# Patient Record
Sex: FEMALE | Race: BLACK OR AFRICAN AMERICAN | NOT HISPANIC OR LATINO | Employment: FULL TIME | URBAN - METROPOLITAN AREA
[De-identification: names, ages, dates, MRNs, and addresses within clinical notes are randomized per-mention and may not be internally consistent; named-entity substitution may affect disease eponyms.]

---

## 2023-09-20 LAB
HEPATITIS B VIRUS SURFACE AG PRESENCE IN SERUM: NONREACTIVE
HEPATITIS C VIRUS AB PRESENCE IN SERUM: NONREACTIVE
HIV 1/ 2 AG/AB SCREEN: NONREACTIVE
SYPHILIS TOTAL AB: NONREACTIVE

## 2023-09-21 LAB
CHLAMYDIA TRACH., AMPLIFIED: NEGATIVE
CLUE CELLS: ABNORMAL
N. GONORRHEA, AMPLIFIED: NEGATIVE
NUGENT SCORE: 6
TRICHOMONAS VAGINALIS: NEGATIVE
VAGINITIS-BV + YEAST INTERPRETATION: ABNORMAL
YEAST: ABNORMAL

## 2024-09-13 ENCOUNTER — OFFICE VISIT (OUTPATIENT)
Dept: OBSTETRICS AND GYNECOLOGY | Facility: CLINIC | Age: 24
End: 2024-09-13
Payer: COMMERCIAL

## 2024-09-13 ENCOUNTER — LAB (OUTPATIENT)
Dept: LAB | Facility: LAB | Age: 24
End: 2024-09-13
Payer: COMMERCIAL

## 2024-09-13 VITALS
DIASTOLIC BLOOD PRESSURE: 87 MMHG | SYSTOLIC BLOOD PRESSURE: 127 MMHG | HEART RATE: 76 BPM | WEIGHT: 181.3 LBS | BODY MASS INDEX: 28.4 KG/M2

## 2024-09-13 DIAGNOSIS — N90.89 VULVAR LESION: ICD-10-CM

## 2024-09-13 DIAGNOSIS — Z11.3 SCREENING FOR STDS (SEXUALLY TRANSMITTED DISEASES): ICD-10-CM

## 2024-09-13 DIAGNOSIS — Z01.419 VISIT FOR GYNECOLOGIC EXAMINATION: ICD-10-CM

## 2024-09-13 DIAGNOSIS — Z01.419 VISIT FOR GYNECOLOGIC EXAMINATION: Primary | ICD-10-CM

## 2024-09-13 DIAGNOSIS — B00.9 HSV-2 (HERPES SIMPLEX VIRUS 2) INFECTION: ICD-10-CM

## 2024-09-13 LAB
ERYTHROCYTE [DISTWIDTH] IN BLOOD BY AUTOMATED COUNT: 13.1 % (ref 11.5–14.5)
EST. AVERAGE GLUCOSE BLD GHB EST-MCNC: 91 MG/DL
HBA1C MFR BLD: 4.8 %
HBV SURFACE AG SERPL QL IA: NONREACTIVE
HCT VFR BLD AUTO: 41.3 % (ref 36–46)
HCV AB SER QL: NONREACTIVE
HGB BLD-MCNC: 12.9 G/DL (ref 12–16)
HIV 1+2 AB+HIV1 P24 AG SERPL QL IA: NONREACTIVE
MCH RBC QN AUTO: 27.2 PG (ref 26–34)
MCHC RBC AUTO-ENTMCNC: 31.2 G/DL (ref 32–36)
MCV RBC AUTO: 87 FL (ref 80–100)
NRBC BLD-RTO: 0 /100 WBCS (ref 0–0)
PLATELET # BLD AUTO: 278 X10*3/UL (ref 150–450)
RBC # BLD AUTO: 4.75 X10*6/UL (ref 4–5.2)
TREPONEMA PALLIDUM IGG+IGM AB [PRESENCE] IN SERUM OR PLASMA BY IMMUNOASSAY: NONREACTIVE
TSH SERPL-ACNC: 0.63 MIU/L (ref 0.44–3.98)
WBC # BLD AUTO: 4.4 X10*3/UL (ref 4.4–11.3)

## 2024-09-13 PROCEDURE — 99395 PREV VISIT EST AGE 18-39: CPT | Performed by: ADVANCED PRACTICE MIDWIFE

## 2024-09-13 PROCEDURE — 84443 ASSAY THYROID STIM HORMONE: CPT

## 2024-09-13 PROCEDURE — 85027 COMPLETE CBC AUTOMATED: CPT

## 2024-09-13 PROCEDURE — 86780 TREPONEMA PALLIDUM: CPT

## 2024-09-13 PROCEDURE — 87340 HEPATITIS B SURFACE AG IA: CPT

## 2024-09-13 PROCEDURE — 87529 HSV DNA AMP PROBE: CPT | Performed by: ADVANCED PRACTICE MIDWIFE

## 2024-09-13 PROCEDURE — 83036 HEMOGLOBIN GLYCOSYLATED A1C: CPT

## 2024-09-13 PROCEDURE — 87389 HIV-1 AG W/HIV-1&-2 AB AG IA: CPT

## 2024-09-13 PROCEDURE — 86803 HEPATITIS C AB TEST: CPT

## 2024-09-13 PROCEDURE — 1036F TOBACCO NON-USER: CPT | Performed by: ADVANCED PRACTICE MIDWIFE

## 2024-09-13 PROCEDURE — 36415 COLL VENOUS BLD VENIPUNCTURE: CPT

## 2024-09-13 RX ORDER — VALACYCLOVIR HYDROCHLORIDE 500 MG/1
500 TABLET, FILM COATED ORAL 2 TIMES DAILY
Qty: 60 TABLET | Refills: 0 | Status: SHIPPED | OUTPATIENT
Start: 2024-09-13 | End: 2024-09-16

## 2024-09-13 ASSESSMENT — PAIN SCALES - GENERAL: PAINLEVEL: 0-NO PAIN

## 2024-09-13 NOTE — PROGRESS NOTES
Subjective   John Paul Jamison is a 24 y.o. female who is here for Annual Exam (LMP 8/26/24/Last pap 9/2021/No currently birthcontrol. /).     Concerns today:  Stopped birth control  LMP 8/26 -8/30     Periods are regular every 27 days, lasting 4 days.   Dysmenorrhea:moderate, occurring first 1-2 days of flow.   Cyclic symptoms include none.      Sexual Activity: sexually active, male partners; Patient reports 0 partners in the last 12 months.  Pain with intercourse? No   Loss of desire? No   Able to have an orgasm? No     History of prior STI: herpes feels she is having outbreak starting   Desires STI screening? Yes    Current contraception: none    Last pap: 2021 WNL  History of abnormal Pap smear: no  Family history of uterine or ovarian cancer: no    Last mammogram: n/a  History of abnormal mammogram: no  Family history of breast cancer: no  OB History   No obstetric history on file.      Objective   /87   Pulse 76   Wt 82.2 kg (181 lb 4.8 oz)   LMP 08/26/2024 (Exact Date)    Physical Exam  Vitals reviewed.   Constitutional:       General: She is not in acute distress.     Appearance: Normal appearance.   HENT:      Head: Normocephalic.   Eyes:      Pupils: Pupils are equal, round, and reactive to light.   Cardiovascular:      Rate and Rhythm: Normal rate and regular rhythm.      Heart sounds: No murmur heard.     No gallop.   Pulmonary:      Effort: Pulmonary effort is normal. No respiratory distress.      Breath sounds: Normal breath sounds. No stridor. No wheezing, rhonchi or rales.   Chest:      Chest wall: No tenderness or crepitus.   Breasts:     Right: No inverted nipple, mass, nipple discharge, skin change or tenderness.      Left: Normal. No inverted nipple, mass, nipple discharge, skin change or tenderness.   Abdominal:      General: Abdomen is flat.      Palpations: Abdomen is soft. There is no mass.      Tenderness: There is no abdominal tenderness. There is no right CVA tenderness, left CVA  tenderness or rebound.      Hernia: No hernia is present.   Genitourinary:     General: Normal vulva.      Pubic Area: No rash.       Labia:         Right: No rash, tenderness, lesion or injury.         Left: No rash, tenderness, lesion or injury.       Urethra: No prolapse or urethral swelling.      Vagina: Normal. No foreign body. No erythema, tenderness, bleeding, lesions or prolapsed vaginal walls.      Cervix: No cervical motion tenderness, friability or lesion.      Uterus: Normal. Not enlarged, not tender and no uterine prolapse.       Adnexa: Right adnexa normal and left adnexa normal.        Right: No mass or tenderness.          Left: No mass or tenderness.        Rectum: Normal.          Comments: 8mm lesion noted on mons- not fluid filled, non-tender, no edema or erythema   Musculoskeletal:      Cervical back: Neck supple. No rigidity or tenderness.   Skin:     General: Skin is warm and dry.   Neurological:      Mental Status: She is alert.   Psychiatric:         Mood and Affect: Mood normal.         Behavior: Behavior normal.         Thought Content: Thought content normal.         Judgment: Judgment normal.          Assessment/Plan   Problem List Items Addressed This Visit             ICD-10-CM    HSV-2 (herpes simplex virus 2) infection B00.9    Relevant Medications    valACYclovir (Valtrex) 500 mg tablet     Other Visit Diagnoses         Codes    Visit for gynecologic examination    -  Primary Z01.419    Relevant Orders    CBC    THINPREP PAP TEST    Hemoglobin A1c    TSH with reflex to Free T4 if abnormal    Screening for STDs (sexually transmitted diseases)     Z11.3    Relevant Orders    THINPREP PAP TEST    Hepatitis C Antibody    HIV-1 and HIV-2 antibodies    Syphilis Screen with Reflex    Hepatitis B Surface Antigen        Strongly favors folliculitis    Follow up in 1 year (on 9/13/2025).  MIKAYLA Atkinson-JAVON

## 2024-09-14 LAB
HSV1 DNA SKIN QL NAA+PROBE: NOT DETECTED
HSV2 DNA SKIN QL NAA+PROBE: NOT DETECTED

## 2024-09-17 ENCOUNTER — TELEPHONE (OUTPATIENT)
Dept: OBSTETRICS AND GYNECOLOGY | Facility: CLINIC | Age: 24
End: 2024-09-17
Payer: COMMERCIAL

## 2024-09-17 LAB
C TRACH RRNA SPEC QL NAA+PROBE: NEGATIVE
N GONORRHOEA DNA SPEC QL PROBE+SIG AMP: NEGATIVE
T VAGINALIS RRNA SPEC QL NAA+PROBE: POSITIVE

## 2024-09-17 NOTE — TELEPHONE ENCOUNTER
----- Message from Jyoti Manzanares sent at 9/17/2024  1:37 PM EDT -----  John Paul has trich I sent meds and commented  You have a sexually transmitted infection called Trichomonas. It can be treated with pills. Please take twice daily for 7days. All sex partners need treatment. Do not have sex until all partners are treated  Jyoti NICHOLS    9/17/24 2453 Tried to reach pt at # listed. Phone rings and then gets a busy signal. Will try back at  later time. [employer] message also sent.

## 2024-09-18 NOTE — PROGRESS NOTES
I spoke with pt.  She is aware of results and will be picking up her medication today.  She had no questions or concerns at this time.

## 2024-09-25 LAB
CYTOLOGY CMNT CVX/VAG CYTO-IMP: NORMAL
LAB AP HPV GENOTYPE QUESTION: YES
LAB AP HPV HR: NORMAL
LAB AP PAP ADDITIONAL TESTS: NORMAL
LABORATORY COMMENT REPORT: NORMAL
LMP START DATE: NORMAL
PATH REPORT.TOTAL CANCER: NORMAL

## 2024-12-05 DIAGNOSIS — A59.9 TRICHOMONAS INFECTION: ICD-10-CM

## 2024-12-06 RX ORDER — METRONIDAZOLE 500 MG/1
500 TABLET ORAL 2 TIMES DAILY
Qty: 14 TABLET | Refills: 0 | Status: SHIPPED | OUTPATIENT
Start: 2024-12-06 | End: 2024-12-13

## 2025-02-27 DIAGNOSIS — B00.9 HSV-2 (HERPES SIMPLEX VIRUS 2) INFECTION: ICD-10-CM

## 2025-02-28 DIAGNOSIS — B00.9 HSV INFECTION: Primary | ICD-10-CM

## 2025-02-28 RX ORDER — VALACYCLOVIR HYDROCHLORIDE 500 MG/1
500 TABLET, FILM COATED ORAL DAILY
Qty: 30 TABLET | Refills: 5 | Status: SHIPPED | OUTPATIENT
Start: 2025-02-28 | End: 2025-08-27

## 2025-02-28 RX ORDER — VALACYCLOVIR HYDROCHLORIDE 500 MG/1
500 TABLET, FILM COATED ORAL 2 TIMES DAILY
Qty: 60 TABLET | Refills: 0 | OUTPATIENT
Start: 2025-02-28 | End: 2025-03-03

## 2025-06-13 ENCOUNTER — OFFICE VISIT (OUTPATIENT)
Dept: OBSTETRICS AND GYNECOLOGY | Facility: CLINIC | Age: 25
End: 2025-06-13
Payer: COMMERCIAL

## 2025-06-13 VITALS
HEART RATE: 63 BPM | BODY MASS INDEX: 24.78 KG/M2 | SYSTOLIC BLOOD PRESSURE: 118 MMHG | WEIGHT: 158.2 LBS | DIASTOLIC BLOOD PRESSURE: 79 MMHG

## 2025-06-13 DIAGNOSIS — Z11.3 SCREEN FOR STD (SEXUALLY TRANSMITTED DISEASE): Primary | ICD-10-CM
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
## 2025-06-13 DIAGNOSIS — N94.6 DYSMENORRHEA: ICD-10-CM

## 2025-06-13 PROCEDURE — 99213 OFFICE O/P EST LOW 20 MIN: CPT | Performed by: ADVANCED PRACTICE MIDWIFE

## 2025-06-13 RX ORDER — MUPIROCIN 20 MG/G
OINTMENT TOPICAL 2 TIMES DAILY
Qty: 15 G | Refills: 0 | Status: SHIPPED | OUTPATIENT
Start: 2025-06-13

## 2025-06-13 RX ORDER — MUPIROCIN CALCIUM 20 MG/G
CREAM TOPICAL 3 TIMES DAILY
Qty: 15 G | Refills: 0 | Status: SHIPPED | OUTPATIENT
Start: 2025-06-13 | End: 2025-06-13

## 2025-06-13 RX ORDER — IBUPROFEN 600 MG/1
600 TABLET, FILM COATED ORAL EVERY 6 HOURS PRN
Qty: 120 TABLET | Refills: 0 | Status: SHIPPED | OUTPATIENT
Start: 2025-06-13 | End: 2025-06-23
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
## 2025-06-14 LAB
C TRACH RRNA SPEC QL NAA+PROBE: NOT DETECTED
HBV SURFACE AG SERPL QL IA: NORMAL
HCV AB SERPL QL IA: NORMAL
N GONORRHOEA RRNA SPEC QL NAA+PROBE: NOT DETECTED
QUEST GC CT AMPLIFIED (ALWAYS MESSAGE): NORMAL
T PALLIDUM AB SER QL IA: NORMAL
T VAGINALIS RRNA SPEC QL NAA+PROBE: NOT DETECTED

## 2025-06-18 LAB
HBV SURFACE AG SERPL QL IA: NORMAL
HCV AB SERPL QL IA: NORMAL
T PALLIDUM AB SER QL IA: NEGATIVE

## 2025-07-01 ENCOUNTER — TELEPHONE (OUTPATIENT)
Dept: OBSTETRICS AND GYNECOLOGY | Facility: CLINIC | Age: 25
End: 2025-07-01
Payer: COMMERCIAL

## 2025-07-01 DIAGNOSIS — Z11.3 SCREENING FOR STD (SEXUALLY TRANSMITTED DISEASE): ICD-10-CM

## 2025-07-01 NOTE — TELEPHONE ENCOUNTER
TELEPHONE CALL TO PATIENT  Identified by name and date of birth.  Requesting results from 6/13 visit - HIV test  Hep C, Hep B, Syphilis completed and normal  HIV test order in system but not collected  Reviewed need for another blood draw if wants HIV done, order still valid  Patient verbalizes understanding and will come in to have done.

## 2025-07-01 NOTE — TELEPHONE ENCOUNTER
----- Message from Nurse Nicole MAK sent at 6/30/2025  3:49 PM EDT -----  Regarding: Test Results  Per ;    John Paul Jamison [phone] calling to discuss test results

## 2025-07-02 LAB
HIV 1+2 AB+HIV1 P24 AG SERPL QL IA: NORMAL
HIV 1+2 AB+HIV1 P24 AG SERPL QL IA: NORMAL

## 2025-07-11 DIAGNOSIS — N94.6 DYSMENORRHEA: ICD-10-CM

## 2025-07-15 RX ORDER — IBUPROFEN 600 MG/1
600 TABLET, FILM COATED ORAL EVERY 6 HOURS PRN
Qty: 120 TABLET | Refills: 0 | Status: SHIPPED | OUTPATIENT
Start: 2025-07-15 | End: 2025-07-25

## 2025-08-04 ENCOUNTER — HOSPITAL ENCOUNTER (EMERGENCY)
Facility: HOSPITAL | Age: 25
Discharge: HOME | End: 2025-08-05
Attending: EMERGENCY MEDICINE
Payer: COMMERCIAL

## 2025-08-04 DIAGNOSIS — R11.2 NAUSEA AND VOMITING, UNSPECIFIED VOMITING TYPE: ICD-10-CM

## 2025-08-04 DIAGNOSIS — K80.21 CALCULUS OF GALLBLADDER WITH BILIARY OBSTRUCTION BUT WITHOUT CHOLECYSTITIS: Primary | ICD-10-CM

## 2025-08-04 LAB
ALBUMIN SERPL BCP-MCNC: 4.6 G/DL (ref 3.4–5)
ALP SERPL-CCNC: 175 U/L (ref 33–110)
ALT SERPL W P-5'-P-CCNC: 313 U/L (ref 7–45)
ANION GAP SERPL CALC-SCNC: 11 MMOL/L (ref 10–20)
AST SERPL W P-5'-P-CCNC: 517 U/L (ref 9–39)
B-HCG SERPL-ACNC: <2 MIU/ML
BASOPHILS # BLD AUTO: 0.03 X10*3/UL (ref 0–0.1)
BASOPHILS NFR BLD AUTO: 0.5 %
BILIRUB SERPL-MCNC: 2 MG/DL (ref 0–1.2)
BUN SERPL-MCNC: 9 MG/DL (ref 6–23)
CALCIUM SERPL-MCNC: 9.7 MG/DL (ref 8.6–10.3)
CHLORIDE SERPL-SCNC: 103 MMOL/L (ref 98–107)
CO2 SERPL-SCNC: 29 MMOL/L (ref 21–32)
CREAT SERPL-MCNC: 0.82 MG/DL (ref 0.5–1.05)
EGFRCR SERPLBLD CKD-EPI 2021: >90 ML/MIN/1.73M*2
EOSINOPHIL # BLD AUTO: 0.04 X10*3/UL (ref 0–0.7)
EOSINOPHIL NFR BLD AUTO: 0.7 %
ERYTHROCYTE [DISTWIDTH] IN BLOOD BY AUTOMATED COUNT: 13.6 % (ref 11.5–14.5)
FLUAV RNA RESP QL NAA+PROBE: NOT DETECTED
FLUBV RNA RESP QL NAA+PROBE: NOT DETECTED
GLUCOSE SERPL-MCNC: 92 MG/DL (ref 74–99)
HCT VFR BLD AUTO: 43.8 % (ref 36–46)
HGB BLD-MCNC: 14 G/DL (ref 12–16)
IMM GRANULOCYTES # BLD AUTO: 0.01 X10*3/UL (ref 0–0.7)
IMM GRANULOCYTES NFR BLD AUTO: 0.2 % (ref 0–0.9)
LYMPHOCYTES # BLD AUTO: 2 X10*3/UL (ref 1.2–4.8)
LYMPHOCYTES NFR BLD AUTO: 35.1 %
MCH RBC QN AUTO: 28 PG (ref 26–34)
MCHC RBC AUTO-ENTMCNC: 32 G/DL (ref 32–36)
MCV RBC AUTO: 88 FL (ref 80–100)
MONOCYTES # BLD AUTO: 0.72 X10*3/UL (ref 0.1–1)
MONOCYTES NFR BLD AUTO: 12.7 %
NEUTROPHILS # BLD AUTO: 2.89 X10*3/UL (ref 1.2–7.7)
NEUTROPHILS NFR BLD AUTO: 50.8 %
NRBC BLD-RTO: 0 /100 WBCS (ref 0–0)
PLATELET # BLD AUTO: 260 X10*3/UL (ref 150–450)
POTASSIUM SERPL-SCNC: 3.8 MMOL/L (ref 3.5–5.3)
PROT SERPL-MCNC: 7.8 G/DL (ref 6.4–8.2)
RBC # BLD AUTO: 5 X10*6/UL (ref 4–5.2)
RSV RNA RESP QL NAA+PROBE: NOT DETECTED
SARS-COV-2 RNA RESP QL NAA+PROBE: NOT DETECTED
SODIUM SERPL-SCNC: 139 MMOL/L (ref 136–145)
WBC # BLD AUTO: 5.7 X10*3/UL (ref 4.4–11.3)

## 2025-08-04 PROCEDURE — 96374 THER/PROPH/DIAG INJ IV PUSH: CPT

## 2025-08-04 PROCEDURE — 99285 EMERGENCY DEPT VISIT HI MDM: CPT | Mod: 25 | Performed by: EMERGENCY MEDICINE

## 2025-08-04 PROCEDURE — 36415 COLL VENOUS BLD VENIPUNCTURE: CPT | Performed by: EMERGENCY MEDICINE

## 2025-08-04 PROCEDURE — 81001 URINALYSIS AUTO W/SCOPE: CPT | Performed by: EMERGENCY MEDICINE

## 2025-08-04 PROCEDURE — 84702 CHORIONIC GONADOTROPIN TEST: CPT | Performed by: EMERGENCY MEDICINE

## 2025-08-04 PROCEDURE — 83690 ASSAY OF LIPASE: CPT | Performed by: EMERGENCY MEDICINE

## 2025-08-04 PROCEDURE — 85025 COMPLETE CBC W/AUTO DIFF WBC: CPT | Performed by: EMERGENCY MEDICINE

## 2025-08-04 PROCEDURE — 87637 SARSCOV2&INF A&B&RSV AMP PRB: CPT | Performed by: EMERGENCY MEDICINE

## 2025-08-04 PROCEDURE — 2500000004 HC RX 250 GENERAL PHARMACY W/ HCPCS (ALT 636 FOR OP/ED): Performed by: EMERGENCY MEDICINE

## 2025-08-04 PROCEDURE — 80053 COMPREHEN METABOLIC PANEL: CPT | Performed by: EMERGENCY MEDICINE

## 2025-08-04 RX ORDER — ONDANSETRON HYDROCHLORIDE 2 MG/ML
4 INJECTION, SOLUTION INTRAVENOUS ONCE
Status: COMPLETED | OUTPATIENT
Start: 2025-08-04 | End: 2025-08-04

## 2025-08-04 RX ADMIN — ONDANSETRON 4 MG: 2 INJECTION, SOLUTION INTRAMUSCULAR; INTRAVENOUS at 23:43

## 2025-08-04 RX ADMIN — SODIUM CHLORIDE 1000 ML: 0.9 INJECTION, SOLUTION INTRAVENOUS at 23:45

## 2025-08-04 ASSESSMENT — PAIN DESCRIPTION - PAIN TYPE: TYPE: ACUTE PAIN

## 2025-08-04 ASSESSMENT — PAIN - FUNCTIONAL ASSESSMENT: PAIN_FUNCTIONAL_ASSESSMENT: 0-10

## 2025-08-04 ASSESSMENT — PAIN DESCRIPTION - ORIENTATION: ORIENTATION: MID

## 2025-08-04 ASSESSMENT — PAIN SCALES - GENERAL
PAINLEVEL_OUTOF10: 4
PAINLEVEL_OUTOF10: 4

## 2025-08-04 ASSESSMENT — PAIN DESCRIPTION - LOCATION: LOCATION: ABDOMEN

## 2025-08-05 ENCOUNTER — APPOINTMENT (OUTPATIENT)
Dept: RADIOLOGY | Facility: HOSPITAL | Age: 25
End: 2025-08-05
Payer: COMMERCIAL

## 2025-08-05 VITALS
DIASTOLIC BLOOD PRESSURE: 71 MMHG | SYSTOLIC BLOOD PRESSURE: 116 MMHG | TEMPERATURE: 98.6 F | OXYGEN SATURATION: 99 % | HEART RATE: 69 BPM | RESPIRATION RATE: 16 BRPM

## 2025-08-05 LAB
APPEARANCE UR: CLEAR
BILIRUB UR STRIP.AUTO-MCNC: ABNORMAL MG/DL
COLOR UR: YELLOW
GLUCOSE UR STRIP.AUTO-MCNC: NORMAL MG/DL
KETONES UR STRIP.AUTO-MCNC: NEGATIVE MG/DL
LEUKOCYTE ESTERASE UR QL STRIP.AUTO: NEGATIVE
LIPASE SERPL-CCNC: 31 U/L (ref 9–82)
NITRITE UR QL STRIP.AUTO: NEGATIVE
PH UR STRIP.AUTO: 7 [PH]
PROT UR STRIP.AUTO-MCNC: ABNORMAL MG/DL
RBC # UR STRIP.AUTO: NEGATIVE MG/DL
RBC #/AREA URNS AUTO: NORMAL /HPF
SP GR UR STRIP.AUTO: 1.02
SQUAMOUS #/AREA URNS AUTO: NORMAL /HPF
UROBILINOGEN UR STRIP.AUTO-MCNC: ABNORMAL MG/DL
WBC #/AREA URNS AUTO: NORMAL /HPF

## 2025-08-05 PROCEDURE — 76705 ECHO EXAM OF ABDOMEN: CPT

## 2025-08-05 PROCEDURE — 74177 CT ABD & PELVIS W/CONTRAST: CPT | Performed by: RADIOLOGY

## 2025-08-05 PROCEDURE — 2550000001 HC RX 255 CONTRASTS: Performed by: EMERGENCY MEDICINE

## 2025-08-05 PROCEDURE — 74177 CT ABD & PELVIS W/CONTRAST: CPT

## 2025-08-05 PROCEDURE — 76705 ECHO EXAM OF ABDOMEN: CPT | Performed by: RADIOLOGY

## 2025-08-05 RX ADMIN — IOHEXOL 75 ML: 350 INJECTION, SOLUTION INTRAVENOUS at 00:25

## 2025-08-05 NOTE — ED PROVIDER NOTES
HPI   Chief Complaint   Patient presents with    Vomiting       Patient presents with nausea vomiting off and on for the last 2 3 days.  She notes that whenever she tries eat several hours later it comes back up.  She denies any blood in her emesis.  She did have diarrhea on the onset of symptoms.  She denies any new fever or cough.  Has no prior abdominal surgery history.  She does smoke occasional marijuana.  Denies any other street drugs.  Has never had vomiting issues with marijuana before.              Patient History   Medical History[1]  Surgical History[2]  Family History[3]  Social History[4]    Physical Exam   ED Triage Vitals [08/04/25 1939]   Temperature Heart Rate Respirations BP   37 °C (98.6 °F) 69 16 121/71      Pulse Ox Temp Source Heart Rate Source Patient Position   100 % Oral Monitor Sitting      BP Location FiO2 (%)     Right arm --       Physical Exam  Vitals and nursing note reviewed.   Constitutional:       General: She is not in acute distress.     Appearance: She is well-developed.   HENT:      Head: Normocephalic and atraumatic.      Nose: Nose normal.      Mouth/Throat:      Mouth: Mucous membranes are moist.     Eyes:      Conjunctiva/sclera: Conjunctivae normal.       Cardiovascular:      Rate and Rhythm: Regular rhythm.      Heart sounds: No murmur heard.  Pulmonary:      Effort: Pulmonary effort is normal. No respiratory distress.   Abdominal:      General: There is no distension.      Palpations: Abdomen is soft.      Tenderness: There is no right CVA tenderness, left CVA tenderness, guarding or rebound.      Comments: Negative Henderson     Musculoskeletal:         General: No swelling.      Cervical back: Neck supple.     Skin:     General: Skin is warm and dry.     Neurological:      General: No focal deficit present.      Mental Status: She is alert and oriented to person, place, and time.     Psychiatric:         Mood and Affect: Mood normal.           ED Course & MDM   Diagnoses  as of 08/05/25 0527   Calculus of gallbladder with biliary obstruction but without cholecystitis   Nausea and vomiting, unspecified vomiting type                 No data recorded     [place] Coma Scale Score: 15 (08/04/25 2122 : Carla Roche RN)                           Medical Decision Making  The patient has no Henderson sign she has no overt jaundice but her bilirubin is slightly elevated and she does have a transaminitis.  Unclear if this is viral hepatitis versus other stomach bug versus alcohol versus gallbladder pathology.  The patient has an unremarkable exam currently does not have any pain.  We did discuss admission but given that she is pain-free at this time she prefers to be discharged home and follow-up with surgery as an outpatient.  She may require outpatient ERCP or follow-up with GI as well.    Procedure  Procedures       [1]   Past Medical History:  Diagnosis Date    Other specified health status     No pertinent past medical history   [2]   Past Surgical History:  Procedure Laterality Date    OTHER SURGICAL HISTORY  09/13/2021    Nose surgery   [3] No family history on file.  [4]   Social History  Tobacco Use    Smoking status: Never    Smokeless tobacco: Never   Vaping Use    Vaping status: Some Days   Substance Use Topics    Alcohol use: Yes    Drug use: Never        David Willoughby MD  08/05/25 0530

## 2025-08-07 ENCOUNTER — HOSPITAL ENCOUNTER (EMERGENCY)
Facility: HOSPITAL | Age: 25
Discharge: HOME | End: 2025-08-07
Attending: EMERGENCY MEDICINE
Payer: COMMERCIAL

## 2025-08-07 VITALS
RESPIRATION RATE: 21 BRPM | BODY MASS INDEX: 23.54 KG/M2 | WEIGHT: 150 LBS | SYSTOLIC BLOOD PRESSURE: 110 MMHG | OXYGEN SATURATION: 100 % | DIASTOLIC BLOOD PRESSURE: 88 MMHG | TEMPERATURE: 97.3 F | HEIGHT: 67 IN | HEART RATE: 74 BPM

## 2025-08-07 DIAGNOSIS — K80.50 BILIARY COLIC: Primary | ICD-10-CM

## 2025-08-07 LAB
ALBUMIN SERPL BCP-MCNC: 4.5 G/DL (ref 3.4–5)
ALP SERPL-CCNC: 119 U/L (ref 33–110)
ALT SERPL W P-5'-P-CCNC: 116 U/L (ref 7–45)
ANION GAP SERPL CALC-SCNC: 12 MMOL/L (ref 10–20)
AST SERPL W P-5'-P-CCNC: 28 U/L (ref 9–39)
B-HCG SERPL-ACNC: <2 MIU/ML
BASOPHILS # BLD AUTO: 0.03 X10*3/UL (ref 0–0.1)
BASOPHILS NFR BLD AUTO: 0.6 %
BILIRUB DIRECT SERPL-MCNC: 0.1 MG/DL (ref 0–0.3)
BILIRUB SERPL-MCNC: 0.8 MG/DL (ref 0–1.2)
BUN SERPL-MCNC: 7 MG/DL (ref 6–23)
CALCIUM SERPL-MCNC: 9.6 MG/DL (ref 8.6–10.3)
CHLORIDE SERPL-SCNC: 104 MMOL/L (ref 98–107)
CO2 SERPL-SCNC: 23 MMOL/L (ref 21–32)
CREAT SERPL-MCNC: 0.83 MG/DL (ref 0.5–1.05)
EGFRCR SERPLBLD CKD-EPI 2021: >90 ML/MIN/1.73M*2
EOSINOPHIL # BLD AUTO: 0.01 X10*3/UL (ref 0–0.7)
EOSINOPHIL NFR BLD AUTO: 0.2 %
ERYTHROCYTE [DISTWIDTH] IN BLOOD BY AUTOMATED COUNT: 13.5 % (ref 11.5–14.5)
GLUCOSE SERPL-MCNC: 105 MG/DL (ref 74–99)
HCT VFR BLD AUTO: 42 % (ref 36–46)
HGB BLD-MCNC: 13.3 G/DL (ref 12–16)
IMM GRANULOCYTES # BLD AUTO: 0.01 X10*3/UL (ref 0–0.7)
IMM GRANULOCYTES NFR BLD AUTO: 0.2 % (ref 0–0.9)
LIPASE SERPL-CCNC: 19 U/L (ref 9–82)
LYMPHOCYTES # BLD AUTO: 0.77 X10*3/UL (ref 1.2–4.8)
LYMPHOCYTES NFR BLD AUTO: 15.7 %
MCH RBC QN AUTO: 27.8 PG (ref 26–34)
MCHC RBC AUTO-ENTMCNC: 31.7 G/DL (ref 32–36)
MCV RBC AUTO: 88 FL (ref 80–100)
MONOCYTES # BLD AUTO: 0.29 X10*3/UL (ref 0.1–1)
MONOCYTES NFR BLD AUTO: 5.9 %
NEUTROPHILS # BLD AUTO: 3.78 X10*3/UL (ref 1.2–7.7)
NEUTROPHILS NFR BLD AUTO: 77.4 %
NRBC BLD-RTO: 0 /100 WBCS (ref 0–0)
PLATELET # BLD AUTO: 249 X10*3/UL (ref 150–450)
POTASSIUM SERPL-SCNC: 3.6 MMOL/L (ref 3.5–5.3)
PROT SERPL-MCNC: 7.7 G/DL (ref 6.4–8.2)
RBC # BLD AUTO: 4.79 X10*6/UL (ref 4–5.2)
SODIUM SERPL-SCNC: 135 MMOL/L (ref 136–145)
WBC # BLD AUTO: 4.9 X10*3/UL (ref 4.4–11.3)

## 2025-08-07 PROCEDURE — 99284 EMERGENCY DEPT VISIT MOD MDM: CPT | Performed by: EMERGENCY MEDICINE

## 2025-08-07 PROCEDURE — 83690 ASSAY OF LIPASE: CPT | Performed by: NURSE PRACTITIONER

## 2025-08-07 PROCEDURE — 82248 BILIRUBIN DIRECT: CPT | Performed by: NURSE PRACTITIONER

## 2025-08-07 PROCEDURE — 2500000004 HC RX 250 GENERAL PHARMACY W/ HCPCS (ALT 636 FOR OP/ED): Performed by: NURSE PRACTITIONER

## 2025-08-07 PROCEDURE — 2500000004 HC RX 250 GENERAL PHARMACY W/ HCPCS (ALT 636 FOR OP/ED): Mod: JZ | Performed by: EMERGENCY MEDICINE

## 2025-08-07 PROCEDURE — 84702 CHORIONIC GONADOTROPIN TEST: CPT | Performed by: EMERGENCY MEDICINE

## 2025-08-07 PROCEDURE — 36415 COLL VENOUS BLD VENIPUNCTURE: CPT | Performed by: NURSE PRACTITIONER

## 2025-08-07 PROCEDURE — 82565 ASSAY OF CREATININE: CPT | Performed by: NURSE PRACTITIONER

## 2025-08-07 PROCEDURE — 85025 COMPLETE CBC W/AUTO DIFF WBC: CPT | Performed by: NURSE PRACTITIONER

## 2025-08-07 PROCEDURE — 96374 THER/PROPH/DIAG INJ IV PUSH: CPT

## 2025-08-07 PROCEDURE — 96375 TX/PRO/DX INJ NEW DRUG ADDON: CPT

## 2025-08-07 PROCEDURE — 82374 ASSAY BLOOD CARBON DIOXIDE: CPT | Performed by: NURSE PRACTITIONER

## 2025-08-07 RX ORDER — TRAMADOL HYDROCHLORIDE 50 MG/1
50 TABLET, FILM COATED ORAL EVERY 6 HOURS PRN
Qty: 15 TABLET | Refills: 0 | Status: SHIPPED | OUTPATIENT
Start: 2025-08-07 | End: 2025-08-11

## 2025-08-07 RX ORDER — MORPHINE SULFATE 4 MG/ML
4 INJECTION, SOLUTION INTRAMUSCULAR; INTRAVENOUS ONCE
Status: COMPLETED | OUTPATIENT
Start: 2025-08-07 | End: 2025-08-07

## 2025-08-07 RX ORDER — KETOROLAC TROMETHAMINE 30 MG/ML
15 INJECTION, SOLUTION INTRAMUSCULAR; INTRAVENOUS ONCE
Status: COMPLETED | OUTPATIENT
Start: 2025-08-07 | End: 2025-08-07

## 2025-08-07 RX ORDER — DROPERIDOL 2.5 MG/ML
1.25 INJECTION, SOLUTION INTRAMUSCULAR; INTRAVENOUS ONCE
Status: COMPLETED | OUTPATIENT
Start: 2025-08-07 | End: 2025-08-07

## 2025-08-07 RX ORDER — ONDANSETRON HYDROCHLORIDE 2 MG/ML
4 INJECTION, SOLUTION INTRAVENOUS ONCE
Status: COMPLETED | OUTPATIENT
Start: 2025-08-07 | End: 2025-08-07

## 2025-08-07 RX ORDER — ONDANSETRON 8 MG/1
8 TABLET, FILM COATED ORAL EVERY 8 HOURS PRN
Qty: 15 TABLET | Refills: 0 | Status: SHIPPED | OUTPATIENT
Start: 2025-08-07

## 2025-08-07 RX ADMIN — DROPERIDOL 1.25 MG: 2.5 INJECTION, SOLUTION INTRAMUSCULAR; INTRAVENOUS at 21:08

## 2025-08-07 RX ADMIN — MORPHINE SULFATE 4 MG: 4 INJECTION, SOLUTION INTRAMUSCULAR; INTRAVENOUS at 21:08

## 2025-08-07 RX ADMIN — KETOROLAC TROMETHAMINE 15 MG: 30 INJECTION, SOLUTION INTRAMUSCULAR; INTRAVENOUS at 17:48

## 2025-08-07 RX ADMIN — ONDANSETRON 4 MG: 2 INJECTION INTRAMUSCULAR; INTRAVENOUS at 17:48

## 2025-08-07 ASSESSMENT — PAIN SCALES - GENERAL: PAINLEVEL_OUTOF10: 10 - WORST POSSIBLE PAIN

## 2025-08-07 ASSESSMENT — PAIN DESCRIPTION - LOCATION: LOCATION: ABDOMEN

## 2025-08-07 ASSESSMENT — PAIN - FUNCTIONAL ASSESSMENT: PAIN_FUNCTIONAL_ASSESSMENT: 0-10

## 2025-08-07 NOTE — ED TRIAGE NOTES
Pt. To ED for right side abdominal pain, nausea, vomiting. Patient was seen 8/4 and was diagnosed with inflammation of the gallbladder symptoms has worsen.

## 2025-08-07 NOTE — ED TRIAGE NOTES
TRIAGE NOTE   I saw the patient as the Clinician in Triage and performed a brief history and physical exam, established acuity, and ordered appropriate tests to develop basic plan of care. Patient will be seen by an [facility], resident and/or physician who will independently evaluate the patient. Please see subsequent provider notes for further details and disposition.     Brief HPI: In brief, John Paul Jamison is a 24 y.o. female that presents for right upper quadrant abdominal pain with nausea and vomiting.  States she was seen here Monday and told that she had gallstones.  She has an appointment next week with general surgery.  States that her symptoms worsened and she is vomited 7 times today with increasing pain..     Focused Physical exam:   Alert and oriented, regular rate and rhythm, right upper quadrant tenderness, negative Henderson sign, abdomen otherwise soft and nonperitoneal    Plan/MDM:   CBC, BMP, hepatic function panel, lipase, urinalysis, urine pregnancy    Please see subsequent provider note for further details and disposition

## 2025-08-08 NOTE — ED PROVIDER NOTES
HPI   Chief Complaint   Patient presents with    Abdominal Pain       Chief complaint: Abdominal pain    History of present illness: Patient is a 24-year-old female who denies any significant past medical history presenting to the emergency department with complaints of abdominal pain.  According to the patient as well as medical records, the patient was recently seen in the emergency department and was diagnosed with elevated LFTs.  At that time, the patient was offered admission to the hospital however she declined.  Since then, the patient is continued to have symptoms.  The patient states that she has had no fever or drainage.  Of time she continues to have abdominal pain and vomiting.  Concern, the patient presents to the emergency department for further evaluation she has no other complaints this time.      History provided by:  Patient   used: No            Patient History   Medical History[1]  Surgical History[2]  Family History[3]  Social History[4]    Physical Exam   ED Triage Vitals   Temperature Heart Rate Respirations BP   08/07/25 1700 08/07/25 1656 08/07/25 1656 08/07/25 1656   36.3 °C (97.3 °F) 74 (!) 21 110/88      Pulse Ox Temp Source Heart Rate Source Patient Position   08/07/25 1656 08/07/25 1700 -- --   100 % Temporal        BP Location FiO2 (%)     -- --             Physical Exam  Constitutional:       Appearance: Normal appearance.   HENT:      Head: Normocephalic and atraumatic.      Right Ear: External ear normal.      Left Ear: External ear normal.      Nose: Nose normal.      Mouth/Throat:      Mouth: Mucous membranes are moist.     Eyes:      General: Lids are normal.      Extraocular Movements: Extraocular movements intact.      Pupils: Pupils are equal, round, and reactive to light.       Cardiovascular:      Rate and Rhythm: Normal rate and regular rhythm.      Heart sounds: Normal heart sounds.   Pulmonary:      Effort: Pulmonary effort is normal.      Breath  sounds: Normal breath sounds.   Abdominal:      General: Abdomen is flat.      Palpations: Abdomen is soft.      Tenderness: There is generalized abdominal tenderness. There is no guarding or rebound.     Musculoskeletal:         General: No deformity. Normal range of motion.      Cervical back: Normal range of motion and neck supple.     Skin:     General: Skin is warm.      Capillary Refill: Capillary refill takes less than 2 seconds.      Coloration: Skin is not jaundiced.     Neurological:      General: No focal deficit present.      Mental Status: She is alert and oriented to person, place, and time.     Psychiatric:         Mood and Affect: Mood normal.         Behavior: Behavior normal.           ED Course & MDM   Diagnoses as of 08/07/25 2053   Biliary colic                 No data recorded     [place] Coma Scale Score: 15 (08/07/25 1956 : Ray Ervin RN)                           Medical Decision Making  Patient remained stable during her time in the emergency department.  CBC demonstrated no significant abnormalities Chem-7 was within normal limits.  The patient's alk phos was 119 and ALT is 116 but no other significant abnormalities LFTs.  Lipase was 19.  Beta-hCG is negative.    Patient presents to the emergency department with complaints of abdominal pain.  During her time in the emergency department patient was given Zofran ketorolac morphine and droperidol.  After this, the patient has significant proved of her symptoms.    I reviewed the patient's chart the patient is having significant improvement of her LFTs I reassured the patient that at this time, her condition appears to be improving the patient has follow-up with surgery as an outpatient which will be required as the patient likely recently experienced cholecystitis or choledocholithiasis was self resolved.  She was instructed to return for any worsening symptoms or fever.  She expressed understanding and agreement.  The patient was then  discharged home in otherwise stable condition.  Patient was given a prescription for Zofran and Ultram for symptom control at home.    Amount and/or Complexity of Data Reviewed  Labs: ordered. Decision-making details documented in ED Course.        Procedure  Procedures         [1]   Past Medical History:  Diagnosis Date    Other specified health status     No pertinent past medical history   [2]   Past Surgical History:  Procedure Laterality Date    OTHER SURGICAL HISTORY  09/13/2021    Nose surgery   [3] No family history on file.  [4]   Social History  Tobacco Use    Smoking status: Never    Smokeless tobacco: Never   Vaping Use    Vaping status: Some Days   Substance Use Topics    Alcohol use: Yes    Drug use: Never        Kedar Alaniz MD  08/10/25 3871

## 2025-08-13 ENCOUNTER — OFFICE VISIT (OUTPATIENT)
Dept: GASTROENTEROLOGY | Facility: CLINIC | Age: 25
End: 2025-08-13
Payer: COMMERCIAL

## 2025-08-13 VITALS
WEIGHT: 163 LBS | HEART RATE: 84 BPM | HEIGHT: 67 IN | DIASTOLIC BLOOD PRESSURE: 67 MMHG | BODY MASS INDEX: 25.58 KG/M2 | SYSTOLIC BLOOD PRESSURE: 103 MMHG | TEMPERATURE: 98.1 F

## 2025-08-13 DIAGNOSIS — K80.50 BILIARY COLIC: ICD-10-CM

## 2025-08-13 DIAGNOSIS — R74.8 ELEVATED LIVER ENZYMES: Primary | ICD-10-CM

## 2025-08-13 PROCEDURE — 3008F BODY MASS INDEX DOCD: CPT | Performed by: INTERNAL MEDICINE

## 2025-08-13 PROCEDURE — 1036F TOBACCO NON-USER: CPT | Performed by: INTERNAL MEDICINE

## 2025-08-13 PROCEDURE — 99212 OFFICE O/P EST SF 10 MIN: CPT

## 2025-08-13 PROCEDURE — 99244 OFF/OP CNSLTJ NEW/EST MOD 40: CPT | Performed by: INTERNAL MEDICINE

## 2025-08-13 RX ORDER — IBUPROFEN 600 MG/1
TABLET, FILM COATED ORAL
COMMUNITY
Start: 2025-07-15

## 2025-08-13 ASSESSMENT — ENCOUNTER SYMPTOMS
BLOOD IN STOOL: 0
PALPITATIONS: 0
SHORTNESS OF BREATH: 0
LIGHT-HEADEDNESS: 0
ABDOMINAL PAIN: 0
DIARRHEA: 0
DIZZINESS: 0
NAUSEA: 0
UNEXPECTED WEIGHT CHANGE: 0
DIAPHORESIS: 0
DIFFICULTY URINATING: 0
DYSURIA: 0
APPETITE CHANGE: 0
RECTAL PAIN: 0
CONSTIPATION: 0
WEAKNESS: 0
CHEST TIGHTNESS: 0
ACTIVITY CHANGE: 0
COUGH: 0
VOMITING: 0
CHILLS: 0
FEVER: 0
FATIGUE: 0
HEMATURIA: 0
ANAL BLEEDING: 0

## 2025-08-15 ENCOUNTER — APPOINTMENT (OUTPATIENT)
Dept: SURGERY | Facility: CLINIC | Age: 25
End: 2025-08-15
Payer: COMMERCIAL

## 2025-08-15 ENCOUNTER — HOSPITAL ENCOUNTER (OUTPATIENT)
Facility: HOSPITAL | Age: 25
Setting detail: OUTPATIENT SURGERY
End: 2025-08-15
Attending: SURGERY | Admitting: SURGERY
Payer: COMMERCIAL

## 2025-08-15 VITALS
BODY MASS INDEX: 25.58 KG/M2 | DIASTOLIC BLOOD PRESSURE: 72 MMHG | HEIGHT: 67 IN | HEART RATE: 40 BPM | SYSTOLIC BLOOD PRESSURE: 108 MMHG | WEIGHT: 163 LBS

## 2025-08-15 DIAGNOSIS — K80.50 CHOLEDOCHOLITHIASIS: Primary | ICD-10-CM

## 2025-08-15 LAB
ALBUMIN SERPL-MCNC: 4.1 G/DL (ref 3.6–5.1)
ALBUMIN/GLOB SERPL: 1.6 (CALC) (ref 1–2.5)
ALP SERPL-CCNC: 71 U/L (ref 31–125)
ALT SERPL-CCNC: 22 U/L (ref 6–29)
ANA SER QL IF: NEGATIVE
ANION GAP SERPL CALCULATED.4IONS-SCNC: 8 MMOL/L (CALC) (ref 7–17)
AST SERPL-CCNC: 13 U/L (ref 10–30)
BASOPHILS # BLD AUTO: 30 CELLS/UL (ref 0–200)
BASOPHILS NFR BLD AUTO: 0.8 %
BILIRUB DIRECT SERPL-MCNC: 0.2 MG/DL
BILIRUB INDIRECT SERPL-MCNC: 0.3 MG/DL (CALC) (ref 0.2–1.2)
BILIRUB SERPL-MCNC: 0.5 MG/DL (ref 0.2–1.2)
BUN SERPL-MCNC: 8 MG/DL (ref 7–25)
BUN/CREAT SERPL: NORMAL (CALC) (ref 6–22)
CALCIUM SERPL-MCNC: 9.2 MG/DL (ref 8.6–10.2)
CHLORIDE SERPL-SCNC: 106 MMOL/L (ref 98–110)
CO2 SERPL-SCNC: 25 MMOL/L (ref 20–32)
CREAT SERPL-MCNC: 0.85 MG/DL (ref 0.5–0.96)
EGFRCR SERPLBLD CKD-EPI 2021: 98 ML/MIN/1.73M2
EOSINOPHIL # BLD AUTO: 148 CELLS/UL (ref 15–500)
EOSINOPHIL NFR BLD AUTO: 4 %
ERYTHROCYTE [DISTWIDTH] IN BLOOD BY AUTOMATED COUNT: 12.6 % (ref 11–15)
GLOBULIN SER CALC-MCNC: 2.6 G/DL (CALC) (ref 1.9–3.7)
GLUCOSE SERPL-MCNC: 93 MG/DL (ref 65–99)
HAV IGM SERPL QL IA: NORMAL
HBV CORE IGM SERPL QL IA: NORMAL
HBV SURFACE AG SERPL QL IA: NORMAL
HCT VFR BLD AUTO: 38 % (ref 35–45)
HCV AB SERPL QL IA: NORMAL
HCV RNA SERPL NAA+PROBE-ACNC: NORMAL IU/ML
HCV RNA SERPL NAA+PROBE-LOG IU: NORMAL LOG IU/ML
HGB BLD-MCNC: 12.2 G/DL (ref 11.7–15.5)
INR PPP: 1.1
LYMPHOCYTES # BLD AUTO: 1924 CELLS/UL (ref 850–3900)
LYMPHOCYTES NFR BLD AUTO: 52 %
MCH RBC QN AUTO: 28.6 PG (ref 27–33)
MCHC RBC AUTO-ENTMCNC: 32.1 G/DL (ref 32–36)
MCV RBC AUTO: 89.2 FL (ref 80–100)
MONOCYTES # BLD AUTO: 348 CELLS/UL (ref 200–950)
MONOCYTES NFR BLD AUTO: 9.4 %
NEUTROPHILS # BLD AUTO: 1251 CELLS/UL (ref 1500–7800)
NEUTROPHILS NFR BLD AUTO: 33.8 %
PLATELET # BLD AUTO: 239 THOUSAND/UL (ref 140–400)
PMV BLD REES-ECKER: 10.9 FL (ref 7.5–12.5)
POTASSIUM SERPL-SCNC: 4.1 MMOL/L (ref 3.5–5.3)
PROT SERPL-MCNC: 6.7 G/DL (ref 6.1–8.1)
PROTHROMBIN TIME: 11.3 SEC (ref 9–11.5)
RBC # BLD AUTO: 4.26 MILLION/UL (ref 3.8–5.1)
SMOOTH MUSCLE AB SER QL IF: NEGATIVE
SODIUM SERPL-SCNC: 139 MMOL/L (ref 135–146)
WBC # BLD AUTO: 3.7 THOUSAND/UL (ref 3.8–10.8)

## 2025-08-15 PROCEDURE — 3008F BODY MASS INDEX DOCD: CPT | Performed by: SURGERY

## 2025-08-15 PROCEDURE — 99204 OFFICE O/P NEW MOD 45 MIN: CPT | Performed by: SURGERY

## 2025-08-15 RX ORDER — GABAPENTIN 300 MG/1
300 CAPSULE ORAL ONCE
OUTPATIENT
Start: 2025-08-15 | End: 2025-08-15

## 2025-08-15 RX ORDER — SCOPOLAMINE 1 MG/3D
1 PATCH, EXTENDED RELEASE TRANSDERMAL
OUTPATIENT
Start: 2025-08-15 | End: 2025-08-18

## 2025-08-15 RX ORDER — ACETAMINOPHEN 325 MG/1
975 TABLET ORAL ONCE
OUTPATIENT
Start: 2025-08-15 | End: 2025-08-15

## 2025-08-15 RX ORDER — CEFAZOLIN SODIUM 2 G/100ML
2 INJECTION, SOLUTION INTRAVENOUS ONCE
OUTPATIENT
Start: 2025-08-15 | End: 2025-08-15

## 2025-08-15 RX ORDER — HEPARIN SODIUM 5000 [USP'U]/ML
5000 INJECTION, SOLUTION INTRAVENOUS; SUBCUTANEOUS ONCE
OUTPATIENT
Start: 2025-08-15 | End: 2025-08-15

## 2025-09-03 ENCOUNTER — APPOINTMENT (OUTPATIENT)
Dept: PREADMISSION TESTING | Facility: HOSPITAL | Age: 25
End: 2025-09-03
Payer: COMMERCIAL

## 2025-09-26 ENCOUNTER — APPOINTMENT (OUTPATIENT)
Dept: SURGERY | Facility: CLINIC | Age: 25
End: 2025-09-26
Payer: COMMERCIAL